# Patient Record
Sex: FEMALE | Race: WHITE | NOT HISPANIC OR LATINO | Employment: FULL TIME | URBAN - NONMETROPOLITAN AREA
[De-identification: names, ages, dates, MRNs, and addresses within clinical notes are randomized per-mention and may not be internally consistent; named-entity substitution may affect disease eponyms.]

---

## 2023-11-17 ENCOUNTER — OFFICE VISIT (OUTPATIENT)
Dept: PRIMARY CARE | Facility: CLINIC | Age: 53
End: 2023-11-17
Payer: COMMERCIAL

## 2023-11-17 VITALS
HEART RATE: 67 BPM | SYSTOLIC BLOOD PRESSURE: 110 MMHG | WEIGHT: 237 LBS | OXYGEN SATURATION: 97 % | DIASTOLIC BLOOD PRESSURE: 70 MMHG | BODY MASS INDEX: 34.01 KG/M2

## 2023-11-17 DIAGNOSIS — Z00.00 WELLNESS EXAMINATION: Primary | ICD-10-CM

## 2023-11-17 DIAGNOSIS — N39.3 STRESS INCONTINENCE: ICD-10-CM

## 2023-11-17 DIAGNOSIS — Z12.31 SCREENING MAMMOGRAM, ENCOUNTER FOR: ICD-10-CM

## 2023-11-17 DIAGNOSIS — E55.9 VITAMIN D DEFICIENCY: ICD-10-CM

## 2023-11-17 DIAGNOSIS — F41.0 PANIC ATTACKS: ICD-10-CM

## 2023-11-17 DIAGNOSIS — R53.83 OTHER FATIGUE: ICD-10-CM

## 2023-11-17 DIAGNOSIS — Z13.6 SCREENING FOR CARDIOVASCULAR CONDITION: ICD-10-CM

## 2023-11-17 PROBLEM — R93.3 ABNORMAL ENDOSCOPY OF UPPER GASTROINTESTINAL TRACT: Status: ACTIVE | Noted: 2023-11-17

## 2023-11-17 PROBLEM — N63.0 BREAST MASS: Status: RESOLVED | Noted: 2023-11-17 | Resolved: 2023-11-17

## 2023-11-17 PROBLEM — N90.5 VULVAR ATROPHY: Status: ACTIVE | Noted: 2023-11-17

## 2023-11-17 PROBLEM — R25.2 MUSCLE CRAMPS: Status: RESOLVED | Noted: 2023-11-17 | Resolved: 2023-11-17

## 2023-11-17 PROBLEM — L65.9 HAIR LOSS: Status: RESOLVED | Noted: 2023-11-17 | Resolved: 2023-11-17

## 2023-11-17 PROBLEM — E66.9 OBESITY (BMI 30-39.9): Status: ACTIVE | Noted: 2023-11-17

## 2023-11-17 PROBLEM — R22.2 CHEST MASS: Status: ACTIVE | Noted: 2023-11-17

## 2023-11-17 PROBLEM — G43.009 MIGRAINE WITHOUT AURA AND WITHOUT STATUS MIGRAINOSUS, NOT INTRACTABLE: Status: RESOLVED | Noted: 2023-11-17 | Resolved: 2023-11-17

## 2023-11-17 PROBLEM — K21.9 CHRONIC GERD: Status: RESOLVED | Noted: 2023-11-17 | Resolved: 2023-11-17

## 2023-11-17 PROBLEM — M23.206 OLD TEAR OF MENISCUS OF RIGHT KNEE: Status: ACTIVE | Noted: 2023-11-17

## 2023-11-17 LAB
ALBUMIN SERPL BCP-MCNC: 4.3 G/DL (ref 3.4–5)
ALP SERPL-CCNC: 63 U/L (ref 33–110)
ALT SERPL W P-5'-P-CCNC: 17 U/L (ref 7–45)
ANION GAP SERPL CALC-SCNC: 11 MMOL/L (ref 10–20)
AST SERPL W P-5'-P-CCNC: 19 U/L (ref 9–39)
BASOPHILS # BLD AUTO: 0.03 X10*3/UL (ref 0–0.1)
BASOPHILS NFR BLD AUTO: 0.5 %
BILIRUB SERPL-MCNC: 0.6 MG/DL (ref 0–1.2)
BUN SERPL-MCNC: 15 MG/DL (ref 6–23)
CALCIUM SERPL-MCNC: 9.3 MG/DL (ref 8.6–10.3)
CHLORIDE SERPL-SCNC: 105 MMOL/L (ref 98–107)
CHOLEST SERPL-MCNC: 141 MG/DL (ref 0–199)
CHOLESTEROL/HDL RATIO: 2.2
CO2 SERPL-SCNC: 28 MMOL/L (ref 21–32)
CREAT SERPL-MCNC: 0.85 MG/DL (ref 0.5–1.05)
EOSINOPHIL # BLD AUTO: 0.12 X10*3/UL (ref 0–0.7)
EOSINOPHIL NFR BLD AUTO: 2 %
ERYTHROCYTE [DISTWIDTH] IN BLOOD BY AUTOMATED COUNT: 13.1 % (ref 11.5–14.5)
GFR SERPL CREATININE-BSD FRML MDRD: 82 ML/MIN/1.73M*2
GLUCOSE SERPL-MCNC: 94 MG/DL (ref 74–99)
HCT VFR BLD AUTO: 44.1 % (ref 36–46)
HDLC SERPL-MCNC: 65.3 MG/DL
HGB BLD-MCNC: 14.1 G/DL (ref 12–16)
IMM GRANULOCYTES # BLD AUTO: 0.01 X10*3/UL (ref 0–0.7)
IMM GRANULOCYTES NFR BLD AUTO: 0.2 % (ref 0–0.9)
LDLC SERPL CALC-MCNC: 66 MG/DL
LYMPHOCYTES # BLD AUTO: 1.66 X10*3/UL (ref 1.2–4.8)
LYMPHOCYTES NFR BLD AUTO: 27.9 %
MCH RBC QN AUTO: 29.4 PG (ref 26–34)
MCHC RBC AUTO-ENTMCNC: 32 G/DL (ref 32–36)
MCV RBC AUTO: 92 FL (ref 80–100)
MONOCYTES # BLD AUTO: 0.6 X10*3/UL (ref 0.1–1)
MONOCYTES NFR BLD AUTO: 10.1 %
NEUTROPHILS # BLD AUTO: 3.53 X10*3/UL (ref 1.2–7.7)
NEUTROPHILS NFR BLD AUTO: 59.3 %
NON HDL CHOLESTEROL: 76 MG/DL (ref 0–149)
NRBC BLD-RTO: 0 /100 WBCS (ref 0–0)
PLATELET # BLD AUTO: 250 X10*3/UL (ref 150–450)
POTASSIUM SERPL-SCNC: 5 MMOL/L (ref 3.5–5.3)
PROT SERPL-MCNC: 7.2 G/DL (ref 6.4–8.2)
RBC # BLD AUTO: 4.8 X10*6/UL (ref 4–5.2)
SODIUM SERPL-SCNC: 139 MMOL/L (ref 136–145)
TRIGL SERPL-MCNC: 48 MG/DL (ref 0–149)
TSH SERPL-ACNC: 1.82 MIU/L (ref 0.44–3.98)
VLDL: 10 MG/DL (ref 0–40)
WBC # BLD AUTO: 6 X10*3/UL (ref 4.4–11.3)

## 2023-11-17 PROCEDURE — 85025 COMPLETE CBC W/AUTO DIFF WBC: CPT

## 2023-11-17 PROCEDURE — 99214 OFFICE O/P EST MOD 30 MIN: CPT | Performed by: FAMILY MEDICINE

## 2023-11-17 PROCEDURE — 80053 COMPREHEN METABOLIC PANEL: CPT

## 2023-11-17 PROCEDURE — 84443 ASSAY THYROID STIM HORMONE: CPT

## 2023-11-17 PROCEDURE — 36415 COLL VENOUS BLD VENIPUNCTURE: CPT

## 2023-11-17 PROCEDURE — 99396 PREV VISIT EST AGE 40-64: CPT | Performed by: FAMILY MEDICINE

## 2023-11-17 PROCEDURE — 1036F TOBACCO NON-USER: CPT | Performed by: FAMILY MEDICINE

## 2023-11-17 PROCEDURE — 82306 VITAMIN D 25 HYDROXY: CPT

## 2023-11-17 PROCEDURE — 80061 LIPID PANEL: CPT

## 2023-11-17 RX ORDER — CHOLECALCIFEROL (VITAMIN D3) 50 MCG
TABLET ORAL
COMMUNITY
Start: 2018-05-25

## 2023-11-17 RX ORDER — MULTIVITAMIN
TABLET ORAL
COMMUNITY

## 2023-11-17 RX ORDER — LEVOCETIRIZINE DIHYDROCHLORIDE 5 MG/1
TABLET, FILM COATED ORAL EVERY EVENING
COMMUNITY

## 2023-11-17 RX ORDER — HYDROXYZINE HYDROCHLORIDE 10 MG/1
10 TABLET, FILM COATED ORAL EVERY 6 HOURS PRN
Qty: 30 TABLET | Refills: 0 | Status: SHIPPED | OUTPATIENT
Start: 2023-11-17

## 2023-11-17 RX ORDER — HYDROXYZINE HYDROCHLORIDE 10 MG/1
TABLET, FILM COATED ORAL
COMMUNITY
Start: 2019-11-27 | End: 2023-11-17 | Stop reason: SDUPTHER

## 2023-11-17 NOTE — PATIENT INSTRUCTIONS
Schedule a yearly well woman exam with me .       For allergy symptoms  -   You can try the over the counter long acting antihistamines :   Claritin  ( loratadine)  or Allegra  (fexofenadine) or Zyrtec (cetirizine) or Xyzal  (levo-cetirizine).   You could also try the steroid nasal sprays:   Fluticasone (Flonase),  Nasacort, or Rhinocort.  Be sure to use them as directed.   If you  do use them,  after you insert them into your nose,   tilt outward toward your eye to avoid nose bleeds.   You may need to stay on these through your allergy season.   You might need only one of these,   but some people need both to control symptoms.    If you try these things for 2 weeks,  and they do not help,  stop them and make an appointment in the office.      Also - for  allergy eye symptoms   (redness, itch , watering)    -  you can try Olopatadine  - an over the counter eye drop that works very well.       Make an appt for your shingrix vaccine      Make an appt with DR Gonsales for the stress incontinence     Make an appt for your mammogram     WELL VISIT/PREVENTATIVE VISIT INSTRUCTIONS:        Call [phone] to schedule any testing ordered at [facility].      For a mammogram, call   [phone] .    Please work on healthy nutrition,  optimal sleep, and regular exercise to feel better.    If you smoke - please quit, and if you drink alcohol, please limit your intake.       Be sure to ask me about any vaccines you may be due for,  and ask me any questions you may have about vaccines.   Generally -  a flu shot is recommended every fall for everyone over 6 months of age,  a pneumonia shot is recommended for anyone 65 and over or who has chronic conditions such as diabetes, heart or lung disease,  the shingles vaccines are recommended for people age 50 and over,   a Tetnas/Pertussis booster is very 10 years (after the childhood set);  the RSV vaccine is for people age 65 and over,  and the COVID vaccines are recommended for  "everyone, but the boosters are often particular people, so ask me if you qualify.      There may be more vaccines you qualify for depending on your medical conditions, so be sure to ask me about that if you have any chronic illnesses.    Some people have insurance that will pay for the vaccines at the pharmacy, and some your doctors office - so be sure to check with your insurance about the best place to get your vaccines and your coverage of them.     Generally speaking,  good nutrition consists of lean meats, like chicken, fish and turkey (not fried);    plenty of fruits and vegetables (the fresher the better);   Less sugars, saturated fats, and processed foods - especially those made with white flour.   Try to eat the majority of your grain foods  as whole grains.   Keep an eye on your total calories in a day and serving sizes on packaging - this will help you limit your overall intake.    Remember, to lose weight (if you need to), your total calorie intake has to be about 300 - 500 calories less than what you burn in a day.   That number depends on your age, gender and body size.   Some people find a fitbit (calorie tracking device) helpful.      Please be sure to see the dentist every 6 months.    Please see the eye doctor no longer than every 2 years.    When you have your Living Will and Medical Power of  papers completed   (they have to be witnessed by 2 non-relatives or notarized to be legally binding),     please keep your originals in a safe place in your home, but make sure all your physicians have copies and that you take copies with you when you go to the hospital.        GETTING TEST RESULTS:    YOU WILL ALWAYS FIND OUT ABOUT ALL YOUR TEST RESULTS FROM ME.  I do not use the \"no news is good news\" policy.   The only time you would not, is if I have told you to get the testing done, and make a follow up appointment to go over it.    Then I will be waiting to talk to you at the visit about your " "test results.     I have a few different ways I get test results to you  (if its something urgent, we call you)  :       If you have a [employer] card -  I will have your test results on your secureach card within a week.  You should get a phone call telling you when the results are on the card, or just call the card in a week.   If two weeks go  by and you have not heard anything, call the card to see if the results are there,  and if not,  leave me a message.  If you are having trouble using [employer], they have a support line you should call :   [phone];   If you have lost your card, I need to know.     IT'S VERY IMPORTANT THAT YOU CALL TO LISTEN TO YOUR RESULTS, AS IT THEN LETS ME KNOW YOU GOT YOUR RESULTS.        If you get your test results on MY CHART,  you may commonly see your results even before I do.      I will always annotate a message on your results so you know that I saw them and how I feel about them.     If you need some help with MY CHART call the support number at [phone].    IF I mail your results to you,   I need to hear from you if you do not receive them within 2 weeks.      Be sure to let me know your preference for getting results.         BILLING FOR PREVENTATIVE VISITS.     Most insurance companies pay for a yearly \"Wellness Check\"  or they may call it a \"Preventative Physical\"   - but it's important to know what your plan covers ahead of the visit.    It's also a good idea to find out what sort of preventative testing they will cover for screening tests - like cholesterol, blood sugar, or colonoscopies. Also, find out which vaccines are covered and if you have any responsibility in paying for them.       If at your Wellness Visit,  we cover anything to do with problems/issues  you are having or  chronic medications/ medical conditions you have,   there will ALSO be a regular office charge that day.     Blood work  or testing obtained that has anything to do with an acute " "issue or chronic condition is billed under that diagnosis and not screening.       It's a good idea to find out from your insurance what is covered and what is your responsibility for all of the above possible charges.           Most importantly,   if you ever have any questions or concerns that cannot wait until your next visit with me,  you can message me through MY CHART or call the office and leave a voice mail message  (please allow for at least 24 hours for responses for these messages).       Take good care.   Dr Wasserman              For General Healthy Nutrition    (Remember - NOT A DIET!   Diets are only good for class reunions.)    These are my general good nutrition recommendations for most people.   I use the term \" diet \"  in these instructions to mean your overall nutrition - how you eat and drink.   If we talked about something different during your visit with me,  other than what is written below,  follow that advice instead.       For most people,  eating healthier means getting less added sugar and less processed foods in your diet    The fresher the better.    Added sugar is now a part of the nutrition label on manufactured food, so you can keep an eye on it easier.    But basically,  foods and beverages  that contain regular sugar and corn syrup are the main sources of added sugars.  Eating as little of these foods as you can is best.   One shocking example of the epidemic of added sugar is soda.    One can of regular soda contains about as much added sugar as 3 regular size doughnuts!     The other issue with processed foods is the amount of processed grains they contain , such as white flour.    This is also something you want to try to limit in your diet.     But, grain products are very important for your nutrition.    Whole grains are better for your body.     Cutting back on white breads, traditional pasta, baked goods, white rice,  and processed cereals will be healthier for you.   The better " choices include whole grain breads,  whole wheat pasta,  brown rice, quinoa, barley, steel cut  or rolled oats.   If you eat cereal for breakfast, try to look for one made with whole grains and less sugar.   There are many people who have a problem with gluten, for a large variety of reasons.    Generally,  products made with wheat flour , barley or rye are the primary source of gluten.       Cutting back on saturated fats is important.    You want the majority of the meat that you eat to be chicken, fish or turkey.   Baked or broiled is best -  fried adds too much fat.    There are healthy fats that are important - fat is important for holding down appetite, vitamin absorption and several metabolic processes in the body.  Monounsaturated fats raise HDL (good cholesterol) and lower LDL (bad cholesterol).   Olive oil, peanut oil, nuts, seeds, and avocados are great sources of the good fats.       Ideas are:   Trade sour cream dip for hummus (which is rich in olive oil) or guacamole; use veggies or whole-wheat chips to dip.    Nuts are an excellent source of protein and healthy fats.   Tree nuts are the best kind, such as almonds or walnuts.   Just be careful - they are high in calories, so stick to a serving size.  (Most are about 200 calories for a 1/4 cup)      Proteins are very important for your body, and they also hold down your appetite.   Try to have protein with every meal.    These generally are meats, nuts, many beans, legumes, eggs, and dairy.   You will find protein in whole grain products and some green vegetables have a little too.     When you have dairy (if you can - many people are lactose intolerant) try to make it low fat.    Ideas are 1% milk, lowfat yogurt or cheeses, low fat cottage cheese.   I don't generally recommend FAT FREE because they often contain artificial products to improve taste, and the fat helps hold down your appetite.   If you are lactose intolerant, try to see if taking Lactaid  "before having dairy helps.      Fresh fruits and vegetables are VERY important.  The brighter the better.   Many vegetables are considered \"Free Foods\" - meaning you can eat as much as you want, and it does not matter.  These include tomatoes, cucumbers, celery, peppers, all the various lettuces and kale - to name a few.   Potatoes, corn and peas are starchy, so do have more calories, but are still healthy - you just want to watch the amount of them you eat.       Fruits are full of wonderful nutrition.   They contain natural sugar called fructose, so eating them in moderation is best.   Diabetics may need to pay careful attention to how their body reacts to the sugar.  Some fruits might drastically increase their blood sugar.      Eating smaller meals with a couple of small snacks is better for your metabolism than not eating for long amounts of time  (breakfast is very important).   Trying to avoid large meals is helpful too.    Eating like this helps keep your appetite down and keeps you in burning metabolism rather than storage metabolism so your body will use the calories you eat.       I do not tell people to stop eating sweets or snack foods - just limit the amounts you have.  The less the better.   Pay attention to serving sizes, and treat them as a treat.        Foods like doughnuts, pop tarts, sugar cereals, cookies  ARE NOT GOOD FOR BREAKFAST.   They are loaded with sugar and will cause you to be hungrier in the day and often not feel well.    Caffeine needs to be limited - no more than 2 servings a day.  Some people can't have any at all.    (if you have any sleep or anxiety issues - stop the caffeine)   Coffee, many teas, many sodas, energy drinks, almost any diet supplement,  and chocolate all contain caffeine.      Water is important.   For most people, 8   x  8 ounces  a day are needed.  This may vary for some health issues.    If you need to be on a low sodium diet, that means looking at labels and " eating only 1000 - 2000 mg of sodium a day.    Calcium intake is important.  3 servings of a high calcium food or drink a day is recommended.   This is usually a cup of milk, a cup of yogurt, an ounce of a hard cheese or 1.5 ounces of a soft cheese are the usual servings.   There are other high calcium foods - including soy or almond milk, broccoli,  almonds, dark green leafy vegetable.   Make sure you are not getting more than 1000  - 1200 mg of total calcium a day (unless you have been told you need more by a doctor).    Vitamin D 3 is important to absorb the calcium and for your immune system.   For children, 400 IU a day is recommended.   For adults - 800 - 5000 IU a day  is recommended.  (Often the amount needed is individualized for adults - be sure to ask how much is right for you)    Physical activity is very important for good health.    Finding activities that give you regular exercise is very important for good health.  Try to find exercise you enjoy doing on a regular basis.    30 minutes at least 5 days a week of a good cardiovascular exercise is recommended.   That means something that gets your heart rate going faster than your usual baseline and you can find yourself breathing harder than usual while you are exercising.  If you have not done any exercise in a long time,  make sure you ask if its safe for you to start,  and be sure to gradually work up to your goal.      If you need to lose weight,  following these recommendations will help you.   And if you are doing all of this and still not losing weight, then its likely just the amount of food you are eating.   Learn to cut back on portion sizes.  Using smaller plates may help.  Healthy weight loss is  only about a pound a week.   You have to remember that whatever you do to lose the weight, you must be prepared to keep it up for life for the weight to stay off.     A lot of people have a lot of luck with using something like a fit bit,  or a  program where you keep track of all of your calories that you eat and what you burn off in the day.

## 2023-11-17 NOTE — PROGRESS NOTES
Subjective   Patient ID: Justine Bazzi is a 53 y.o. female who presents for Annual Exam (Wants some bloodwork.).    HPI   LOV - 2022-      Updates and concerns:   PACU manager - discussed all involved          Gets tired off and on  - more than before   Headaches more the past 3 weeks -   Sinuses are always congested -           Taking Xyzal  - that helps   Does get some lightheadedness off and on     needs to refill hydroxyzine - takes prn -   tolerates it well - needs 1/2 - 1 only -works well when she needs it - rare         Last WWE -    s/p total hyst and no cervix (Fam Hx of cancer and MAYANK 3 on pap) -    Used the one tube of premarin - and it helped - not needed since.     Does have stress incon  - would like referral to [facility]       - 4  Para - 4     Last mammogram - 2022-  WNL   Does have breast implants -    MOTHER BRCA - AGE 50  Concerns - none     Bone density - TOO YOUNG  Last DEXA  fracture history     Last colonoscopy -   19 - WNL - good for 10 years          EGD too - there was external compression - CT 2019 -   QUESTION OF SOFT TISSUE DENSITY NEAR ESOPHAGUS   NO COLON CANCER IN FAMILY  Repeat CT  - stable         No GERD issues      Last cholesterol -   SCREEN AT HOSP - 164 TOTAL - outstanding      Last blood sugar -   SCREEN AT HOSP 10/2019 - WNL      Hep C screen? - LOW RISK  HIV screen? - LOW RISK     vaccines -  Flu vaccine:   UTD   adacel in 2017   Shingrix  - got only one 22  COVID VACC X 3 DONE      vision - LAST TO EYE DOC 5 YEARS AGO - HAD LASIK about       dentist - GOES EVERY 6 MOS     BMI/weight -32   nutrition - EATING WELL   exercise - moving a lot      depression - doing well      Still head of surgery at [employer]      sleep - WELL WHEN SHE CAN SLEEP - BRAIN DOES NOT SHUT OFF      Functional Status: FULLY FUNCTIONAL      smoking status - NEVER SMOKED - NO TOBACCO  Need Screening Lung CT?     alcohol consumption - SOCIAL      Need coronary  calcium score? - DAD HAD MI AT 50 - Cor CT 2017 - ZERO      LIVING WILL/MEDICAL POA - NOT YET               Review of Systems    Objective   /70 (BP Location: Right arm, Patient Position: Sitting, BP Cuff Size: Large adult)   Pulse 67   Wt 108 kg (237 lb)   SpO2 97%   BMI 34.01 kg/m²     Physical Exam  Vitals reviewed.   Constitutional:       General: She is not in acute distress.     Appearance: She is obese. She is not ill-appearing, toxic-appearing or diaphoretic.   HENT:      Head: Normocephalic and atraumatic.      Right Ear: Tympanic membrane, ear canal and external ear normal.      Left Ear: Tympanic membrane, ear canal and external ear normal.      Nose: Nose normal.      Mouth/Throat:      Mouth: Mucous membranes are moist.      Pharynx: No posterior oropharyngeal erythema.   Eyes:      General: No scleral icterus.     Extraocular Movements: Extraocular movements intact.      Pupils: Pupils are equal, round, and reactive to light.   Cardiovascular:      Rate and Rhythm: Normal rate and regular rhythm.      Pulses: Normal pulses.      Heart sounds: No murmur heard.  Pulmonary:      Effort: Pulmonary effort is normal. No respiratory distress.      Breath sounds: Normal breath sounds. No wheezing.   Abdominal:      General: Bowel sounds are normal. There is no distension.      Palpations: Abdomen is soft.      Tenderness: There is no abdominal tenderness.   Musculoskeletal:         General: Normal range of motion.      Cervical back: Neck supple. No rigidity.      Right lower leg: No edema.      Left lower leg: No edema.   Lymphadenopathy:      Cervical: No cervical adenopathy.   Skin:     General: Skin is warm and dry.      Findings: No rash.   Neurological:      General: No focal deficit present.      Mental Status: She is alert and oriented to person, place, and time.   Psychiatric:         Mood and Affect: Mood normal.         Thought Content: Thought content normal.         Assessment/Plan    Problem List Items Addressed This Visit             ICD-10-CM       Medium    Panic attacks F41.0    Relevant Medications    hydrOXYzine HCL (Atarax) 10 mg tablet    Stress incontinence N39.3    Relevant Orders    Referral to Urogynecology    Vitamin D deficiency E55.9    Relevant Orders    Vitamin D 25-Hydroxy,Total (for eval of Vitamin D levels)     Other Visit Diagnoses         Codes    Wellness examination    -  Primary Z00.00    Screening mammogram, encounter for     Z12.31    Relevant Orders    BI mammo bilateral screening tomosynthesis    Other fatigue     R53.83    Relevant Orders    Comprehensive Metabolic Panel    CBC and Auto Differential    TSH with reflex to Free T4 if abnormal    Screening for cardiovascular condition     Z13.6    Relevant Orders    Lipid Panel          Gen wellness today  -     Discussed allergy meds     Checking labs -     Refilled hydroxyzine for the episodes of anxiety    Refer to urogyn for stress incon     We discussed at visit any disease processes that were of concern as well as the risks, benefits and instructions of any new medication provided.    See orders and discussion section for information handed to patient on their Clinical Summary.   Patient (and/or caretaker of patient if present)  stated all questions were answered, and they voiced understanding of instructions.

## 2023-11-18 LAB — 25(OH)D3 SERPL-MCNC: 35 NG/ML (ref 30–100)

## 2023-11-20 ENCOUNTER — PHARMACY VISIT (OUTPATIENT)
Dept: PHARMACY | Facility: CLINIC | Age: 53
End: 2023-11-20
Payer: COMMERCIAL

## 2023-11-20 PROCEDURE — RXMED WILLOW AMBULATORY MEDICATION CHARGE

## 2023-11-21 ENCOUNTER — PHARMACY VISIT (OUTPATIENT)
Dept: PHARMACY | Facility: CLINIC | Age: 53
End: 2023-11-21

## 2023-11-21 PROCEDURE — RXOTC WILLOW AMBULATORY OTC CHARGE

## 2023-12-04 ENCOUNTER — APPOINTMENT (OUTPATIENT)
Dept: PRIMARY CARE | Facility: CLINIC | Age: 53
End: 2023-12-04

## 2023-12-15 ENCOUNTER — APPOINTMENT (OUTPATIENT)
Dept: PRIMARY CARE | Facility: CLINIC | Age: 53
End: 2023-12-15
Payer: COMMERCIAL

## 2024-06-24 ENCOUNTER — PATIENT MESSAGE (OUTPATIENT)
Dept: PRIMARY CARE | Facility: CLINIC | Age: 54
End: 2024-06-24
Payer: COMMERCIAL

## 2024-06-24 ENCOUNTER — PHARMACY VISIT (OUTPATIENT)
Dept: PHARMACY | Facility: CLINIC | Age: 54
End: 2024-06-24
Payer: COMMERCIAL

## 2024-06-24 DIAGNOSIS — J01.90 ACUTE SINUSITIS, RECURRENCE NOT SPECIFIED, UNSPECIFIED LOCATION: Primary | ICD-10-CM

## 2024-06-24 PROCEDURE — RXMED WILLOW AMBULATORY MEDICATION CHARGE

## 2024-06-24 RX ORDER — AZITHROMYCIN 250 MG/1
TABLET, FILM COATED ORAL
Qty: 6 TABLET | Refills: 0 | Status: SHIPPED | OUTPATIENT
Start: 2024-06-24 | End: 2024-06-29

## 2024-07-15 ENCOUNTER — HOSPITAL ENCOUNTER (OUTPATIENT)
Dept: RADIOLOGY | Facility: HOSPITAL | Age: 54
Discharge: HOME | End: 2024-07-15
Payer: COMMERCIAL

## 2024-07-15 VITALS — BODY MASS INDEX: 31.78 KG/M2 | HEIGHT: 70 IN | WEIGHT: 222 LBS

## 2024-07-15 DIAGNOSIS — Z12.31 SCREENING MAMMOGRAM, ENCOUNTER FOR: ICD-10-CM

## 2024-07-15 PROCEDURE — 77063 BREAST TOMOSYNTHESIS BI: CPT

## 2024-07-15 PROCEDURE — 77067 SCR MAMMO BI INCL CAD: CPT | Performed by: RADIOLOGY

## 2024-07-15 PROCEDURE — 77063 BREAST TOMOSYNTHESIS BI: CPT | Performed by: RADIOLOGY

## 2024-11-18 ENCOUNTER — TELEMEDICINE (OUTPATIENT)
Dept: PRIMARY CARE | Facility: CLINIC | Age: 54
End: 2024-11-18
Payer: COMMERCIAL

## 2024-11-18 ENCOUNTER — PHARMACY VISIT (OUTPATIENT)
Dept: PHARMACY | Facility: CLINIC | Age: 54
End: 2024-11-18
Payer: COMMERCIAL

## 2024-11-18 DIAGNOSIS — J32.9 BACTERIAL SINUSITIS: Primary | ICD-10-CM

## 2024-11-18 DIAGNOSIS — B96.89 BACTERIAL SINUSITIS: Primary | ICD-10-CM

## 2024-11-18 PROCEDURE — RXMED WILLOW AMBULATORY MEDICATION CHARGE

## 2024-11-18 PROCEDURE — 99213 OFFICE O/P EST LOW 20 MIN: CPT | Performed by: FAMILY MEDICINE

## 2024-11-18 RX ORDER — DOXYCYCLINE 100 MG/1
100 CAPSULE ORAL 2 TIMES DAILY
Qty: 20 CAPSULE | Refills: 0 | Status: SHIPPED | OUTPATIENT
Start: 2024-11-18 | End: 2024-11-28

## 2024-11-18 ASSESSMENT — ENCOUNTER SYMPTOMS
SINUS PAIN: 1
SINUS PRESSURE: 1
SHORTNESS OF BREATH: 0
ACTIVITY CHANGE: 0
FEVER: 0
HEADACHES: 0
COUGH: 1
FATIGUE: 0
DIZZINESS: 0

## 2024-11-18 ASSESSMENT — LIFESTYLE VARIABLES: HISTORY_OF_SMOKING: I HAVE NEVER SMOKED

## 2024-11-18 NOTE — PROGRESS NOTES
Subjective   Patient ID: Justine Bazzi is a 54 y.o. female who presents for No chief complaint on file..    Virtual or Telephone Consent    An interactive audio and video telecommunication system which permits real time communications between the patient (at the originating site) and provider (at the distant site) was utilized to provide this telehealth service.   Verbal consent was requested and obtained from Justine Bazzi on this date, 11/18/24 for a telehealth visit.      Bacterial sinus   - reports she has been having sinus symptoms for approximately 2 weeks   - initially symptoms started with thinner more allergy type symptoms   - now having thicker dark green discharge   - now having more fatigue and cough   - does endorse body aches          Review of Systems   Constitutional:  Negative for activity change, fatigue and fever.   HENT:  Positive for sinus pressure and sinus pain.    Respiratory:  Positive for cough. Negative for shortness of breath.    Cardiovascular:  Negative for chest pain.   Neurological:  Negative for dizziness and headaches.       Objective   There were no vitals taken for this visit.    Physical Exam  Constitutional:       Appearance: Normal appearance.   Neurological:      Mental Status: She is alert.   Psychiatric:         Mood and Affect: Mood normal.         Behavior: Behavior normal.         Assessment/Plan   Problem List Items Addressed This Visit    None  Visit Diagnoses         Codes    Bacterial sinusitis    -  Primary J32.9, B96.89    stable   - tx with abx given duration of symptoms   - f/u PRN if not improving     Relevant Medications    doxycycline (Vibramycin) 100 mg capsule

## 2024-11-20 ENCOUNTER — PHARMACY VISIT (OUTPATIENT)
Dept: PHARMACY | Facility: CLINIC | Age: 54
End: 2024-11-20
Payer: COMMERCIAL

## 2024-11-20 PROCEDURE — RXMED WILLOW AMBULATORY MEDICATION CHARGE

## 2024-11-20 RX ORDER — AZITHROMYCIN 250 MG/1
TABLET, FILM COATED ORAL
Qty: 6 TABLET | Refills: 1 | OUTPATIENT
Start: 2024-11-20

## 2024-12-02 PROCEDURE — RXMED WILLOW AMBULATORY MEDICATION CHARGE

## 2024-12-06 ENCOUNTER — PHARMACY VISIT (OUTPATIENT)
Dept: PHARMACY | Facility: CLINIC | Age: 54
End: 2024-12-06
Payer: COMMERCIAL

## 2025-01-02 ENCOUNTER — APPOINTMENT (OUTPATIENT)
Dept: PRIMARY CARE | Facility: CLINIC | Age: 55
End: 2025-01-02
Payer: COMMERCIAL

## 2025-01-02 VITALS
HEART RATE: 58 BPM | WEIGHT: 236 LBS | DIASTOLIC BLOOD PRESSURE: 76 MMHG | OXYGEN SATURATION: 98 % | SYSTOLIC BLOOD PRESSURE: 112 MMHG | BODY MASS INDEX: 33.86 KG/M2

## 2025-01-02 DIAGNOSIS — L65.9 HAIR LOSS: ICD-10-CM

## 2025-01-02 DIAGNOSIS — M54.50 CHRONIC LEFT-SIDED LOW BACK PAIN WITHOUT SCIATICA: ICD-10-CM

## 2025-01-02 DIAGNOSIS — G89.29 CHRONIC LEFT-SIDED LOW BACK PAIN WITHOUT SCIATICA: ICD-10-CM

## 2025-01-02 DIAGNOSIS — F41.0 PANIC ATTACKS: ICD-10-CM

## 2025-01-02 DIAGNOSIS — Z00.00 WELLNESS EXAMINATION: Primary | ICD-10-CM

## 2025-01-02 DIAGNOSIS — Z13.6 SCREENING FOR CARDIOVASCULAR CONDITION: ICD-10-CM

## 2025-01-02 PROCEDURE — 99396 PREV VISIT EST AGE 40-64: CPT | Performed by: FAMILY MEDICINE

## 2025-01-02 PROCEDURE — 1036F TOBACCO NON-USER: CPT | Performed by: FAMILY MEDICINE

## 2025-01-02 RX ORDER — HYDROXYZINE HYDROCHLORIDE 10 MG/1
10 TABLET, FILM COATED ORAL EVERY 6 HOURS PRN
Qty: 30 TABLET | Refills: 2 | Status: SHIPPED | OUTPATIENT
Start: 2025-01-02

## 2025-01-02 NOTE — PATIENT INSTRUCTIONS
Next year you can make your apointment a well woman exam     Daily exercises we discussed for the hip -   If not helping - let me know - will need to go to physical therapy     Call [phone]  to set up coronary CT       You need to find a regular exercise routine       WELL VISIT/PREVENTATIVE VISIT INSTRUCTIONS:        Call [phone] to schedule any testing ordered at [facility].      For a mammogram, call   [phone]    Please work on healthy nutrition,  optimal sleep, and regular exercise to feel better.    If you smoke - please quit, and if you drink alcohol, please limit your intake.       Be sure to ask me about any vaccines you may be due for,  and ask me any questions you may have about vaccines.   Generally -  a flu shot is recommended every fall for everyone over 6 months of age,  a pneumonia shot is recommended for anyone 65 and over or who has chronic conditions such as diabetes, heart or lung disease,  the shingles vaccines are recommended for people age 50 and over,   a Tetnas/Pertussis booster is very 10 years (after the childhood set);  the RSV vaccine is for people age 65 and over,  and the COVID vaccines are recommended for everyone, but the boosters are often particular people, so ask me if you qualify.      There may be more vaccines you qualify for depending on your medical conditions, so be sure to ask me about that if you have any chronic illnesses.    Some people have insurance that will pay for the vaccines at the pharmacy, and some your doctors office - so be sure to check with your insurance about the best place to get your vaccines and your coverage of them.     Generally speaking,  good nutrition consists of lean meats, like chicken, fish and turkey (not fried);    plenty of fruits and vegetables (the fresher the better);   Less sugars, saturated fats, and processed foods - especially those made with white flour.   Try to eat the majority of your grain foods  as  "whole grains.   Keep an eye on your total calories in a day and serving sizes on packaging - this will help you limit your overall intake.    Remember, to lose weight (if you need to), your total calorie intake has to be about 300 - 500 calories less than what you burn in a day.   That number depends on your age, gender and body size.   Some people find a fitbit (calorie tracking device) helpful.      Please be sure to see the dentist every 6 months.    Please see the eye doctor no longer than every 2 years.    When you have your Living Will and Medical Power of  papers completed   (they have to be witnessed by 2 non-relatives or notarized to be legally binding),     please keep your originals in a safe place in your home, but make sure all your physicians have copies and that you take copies with you when you go to the hospital.        GETTING TEST RESULTS:    YOU WILL ALWAYS FIND OUT ABOUT ALL YOUR TEST RESULTS FROM ME.  I do not use the \"no news is good news\" policy.   The only time you would not, is if I have told you to get the testing done, and make a follow up appointment to go over it.    Then I will be waiting to talk to you at the visit about your test results.     I have a few different ways I get test results to you  (if its something urgent, we call you)  :       If you have a [employer] card -  I will have your test results on your secureach card within a week.  You should get a phone call telling you when the results are on the card, or just call the card in a week.   If two weeks go  by and you have not heard anything, call the card to see if the results are there,  and if not,  leave me a message.  If you are having trouble using [employer], they have a support line you should call :   [phone];   If you have lost your card, I need to know.     IT'S VERY IMPORTANT THAT YOU CALL TO LISTEN TO YOUR RESULTS, AS IT THEN LETS ME KNOW YOU GOT YOUR RESULTS.        If you get your test " "results on MY CHART,  you may commonly see your results even before I do.      I will always annotate a message on your results so you know that I saw them and how I feel about them.     If you need some help with MY CHART call the support number at [phone].    IF I mail your results to you,   I need to hear from you if you do not receive them within 2 weeks.      Be sure to let me know your preference for getting results.         BILLING FOR PREVENTATIVE VISITS.     Most insurance companies pay for a yearly \"Wellness Check\"  or they may call it a \"Preventative Physical\"   - but it's important to know what your plan covers ahead of the visit.    It's also a good idea to find out what sort of preventative testing they will cover for screening tests - like cholesterol, blood sugar, or colonoscopies. Also, find out which vaccines are covered and if you have any responsibility in paying for them.       If at your Wellness Visit,  we cover anything to do with problems/issues  you are having or  chronic medications/ medical conditions you have,   there will ALSO be a regular office charge that day.     Blood work  or testing obtained that has anything to do with an acute issue or chronic condition is billed under that diagnosis and not screening.       It's a good idea to find out from your insurance what is covered and what is your responsibility for all of the above possible charges.           Most importantly,   if you ever have any questions or concerns that cannot wait until your next visit with me,  you can message me through MY CHART or call the office and leave a voice mail message  (please allow for at least 24 hours for responses for these messages).       Take good care.   Dr Wasserman            For General Healthy Nutrition    (Remember - NOT A DIET!   Diets are only good for class reunions.)    These are my general good nutrition recommendations for most people.   I use the term \" diet \"  in these " instructions to mean your overall nutrition - how you eat and drink.   If we talked about something different during your visit with me,  other than what is written below,  follow that advice instead.       For most people,  eating healthier means getting less added sugar and less processed foods in your diet    The fresher the better.    Added sugar is now a part of the nutrition label on manufactured food, so you can keep an eye on it easier.    But basically,  foods and beverages  that contain regular sugar and corn syrup are the main sources of added sugars.  Eating as little of these foods as you can is best.   One shocking example of the epidemic of added sugar is soda.    One can of regular soda contains about as much added sugar as 3 regular size doughnuts!     The other issue with processed foods is the amount of processed grains they contain , such as white flour.    This is also something you want to try to limit in your diet.     But, grain products are very important for your nutrition.    Whole grains are better for your body.     Cutting back on white breads, traditional pasta, baked goods, white rice,  and processed cereals will be healthier for you.   The better choices include whole grain breads,  whole wheat pasta,  brown rice, quinoa, barley, steel cut  or rolled oats.   If you eat cereal for breakfast, try to look for one made with whole grains and less sugar.   There are many people who have a problem with gluten, for a large variety of reasons.    Generally,  products made with wheat flour , barley or rye are the primary source of gluten.       Cutting back on saturated fats is important.    You want the majority of the meat that you eat to be chicken, fish or turkey.   Baked or broiled is best -  fried adds too much fat.    There are healthy fats that are important - fat is important for holding down appetite, vitamin absorption and several metabolic processes in the body.  Monounsaturated  "fats raise HDL (good cholesterol) and lower LDL (bad cholesterol).   Olive oil, peanut oil, nuts, seeds, and avocados are great sources of the good fats.       Ideas are:   Trade sour cream dip for hummus (which is rich in olive oil) or guacamole; use veggies or whole-wheat chips to dip.    Nuts are an excellent source of protein and healthy fats.   Tree nuts are the best kind, such as almonds or walnuts.   Just be careful - they are high in calories, so stick to a serving size.  (Most are about 200 calories for a 1/4 cup)      Proteins are very important for your body, and they also hold down your appetite.   Try to have protein with every meal.    These generally are meats, nuts, many beans, legumes, eggs, and dairy.   You will find protein in whole grain products and some green vegetables have a little too.     When you have dairy (if you can - many people are lactose intolerant) try to make it low fat.    Ideas are 1% milk, lowfat yogurt or cheeses, low fat cottage cheese.   I don't generally recommend FAT FREE because they often contain artificial products to improve taste, and the fat helps hold down your appetite.   If you are lactose intolerant, try to see if taking Lactaid before having dairy helps.      Fresh fruits and vegetables are VERY important.  The brighter the better.   Many vegetables are considered \"Free Foods\" - meaning you can eat as much as you want, and it does not matter.  These include tomatoes, cucumbers, celery, peppers, all the various lettuces and kale - to name a few.   Potatoes, corn and peas are starchy, so do have more calories, but are still healthy - you just want to watch the amount of them you eat.       Fruits are full of wonderful nutrition.   They contain natural sugar called fructose, so eating them in moderation is best.   Diabetics may need to pay careful attention to how their body reacts to the sugar.  Some fruits might drastically increase their blood sugar.      Eating " smaller meals with a couple of small snacks is better for your metabolism than not eating for long amounts of time  (breakfast is very important).   Trying to avoid large meals is helpful too.    Eating like this helps keep your appetite down and keeps you in burning metabolism rather than storage metabolism so your body will use the calories you eat.       I do not tell people to stop eating sweets or snack foods - just limit the amounts you have.  The less the better.   Pay attention to serving sizes, and treat them as a treat.        Foods like doughnuts, pop tarts, sugar cereals, cookies  ARE NOT GOOD FOR BREAKFAST.   They are loaded with sugar and will cause you to be hungrier in the day and often not feel well.    Caffeine needs to be limited - no more than 2 servings a day.  Some people can't have any at all.    (if you have any sleep or anxiety issues - stop the caffeine)   Coffee, many teas, many sodas, energy drinks, almost any diet supplement,  and chocolate all contain caffeine.      Water is important.   For most people, 8   x  8 ounces  a day are needed.  This may vary for some health issues.    If you need to be on a low sodium diet, that means looking at labels and eating only 1000 - 2000 mg of sodium a day.    Calcium intake is important.  3 servings of a high calcium food or drink a day is recommended.   This is usually a cup of milk, a cup of yogurt, an ounce of a hard cheese or 1.5 ounces of a soft cheese are the usual servings.   There are other high calcium foods - including soy or almond milk, broccoli,  almonds, dark green leafy vegetable.   Make sure you are not getting more than 1000  - 1200 mg of total calcium a day (unless you have been told you need more by a doctor).    Vitamin D 3 is important to absorb the calcium and for your immune system.   For children, 400 IU a day is recommended.   For adults - 800 - 5000 IU a day  is recommended.  (Often the amount needed is individualized for  adults - be sure to ask how much is right for you)    Physical activity is very important for good health.    Finding activities that give you regular exercise is very important for good health.  Try to find exercise you enjoy doing on a regular basis.    30 minutes at least 5 days a week of a good cardiovascular exercise is recommended.   That means something that gets your heart rate going faster than your usual baseline and you can find yourself breathing harder than usual while you are exercising.  If you have not done any exercise in a long time,  make sure you ask if its safe for you to start,  and be sure to gradually work up to your goal.      If you need to lose weight,  following these recommendations will help you.   And if you are doing all of this and still not losing weight, then its likely just the amount of food you are eating.   Learn to cut back on portion sizes.  Using smaller plates may help.  Healthy weight loss is  only about a pound a week.   You have to remember that whatever you do to lose the weight, you must be prepared to keep it up for life for the weight to stay off.     A lot of people have a lot of luck with using something like a fit bit,  or a program where you keep track of all of your calories that you eat and what you burn off in the day.

## 2025-01-02 NOTE — ASSESSMENT & PLAN NOTE
Orders:    hydrOXYzine HCL (Atarax) 10 mg tablet; Take 1 tablet (10 mg) by mouth every 6 hours if needed for anxiety.

## 2025-01-02 NOTE — PROGRESS NOTES
Subjective   Patient ID: Justine Bazzi is a 54 y.o. female who presents for Annual Exam (Yearly check up and talk about some things.).    HPI   LOV - 2023     Updates and concerns:     Having an ache in her left hip -   Originates left low back   Does not hurt when she walks   Pain travels down her leg when she is lying on that side -   If turns it gets better     She was on the semeglutide  - but started to lose hair  - stopped it 3 months ago  - hair is still very thin   Eating is very good - but not getting enough exercise     Taking magnesium for leg cramps     Poor libido    Labs last year were very good         Chronic issues reviewed today :     Allergy and sinus issues -   Takes Xyzal prn       Anxiety  -   Takes hydroxyzine prn large crowds or flying   Needs refill   Works well                Last WWE -    s/p total hyst and no cervix (Fam Hx of cancer and MAYANK 3 on pap) -    Used the one tube of premarin - and it helped - not needed since.     Does have stress incon  - would like referral to [facility]  - to make appt       - 4  Para - 4     Last mammogram - 7/15/24 -  WNL   Does have breast implants -    MOTHER BRCA - AGE 51  Concerns - none     Bone density - TOO YOUNG  Last DEXA  fracture history     Last colonoscopy -   19 - WNL - good for 10 years          EGD too - there was external compression - CT 2019 -   QUESTION OF SOFT TISSUE DENSITY NEAR ESOPHAGUS   NO COLON CANCER IN FAMILY  Repeat CT  - stable         No GERD issues     CV risk -      Last cholesterol - 2023 - great      Last blood sugar -  2023 - great     Need coronary calcium score? - DAD HAD MI AT 51 - Cor CT 2017 - ZERO -   Would like to repeat      Hep C screen? - LOW RISK  HIV screen? 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     alcohol consumption - SOCIAL         LIVING WILL/MEDICAL POA - NOT YET          Review of Systems    Objective   /76 (BP Location: Right arm, Patient Position: Sitting, BP Cuff Size: Large adult)   Pulse 58   Wt 107 kg (236 lb)   SpO2 98%   BMI 33.86 kg/m²     Physical Exam  Vitals reviewed.   Constitutional:       General: She is not in acute distress.     Appearance: She is obese. She is not ill-appearing, toxic-appearing or diaphoretic.   HENT:      Head: Normocephalic and atraumatic.      Right Ear: Tympanic membrane, ear canal and external ear normal.      Left Ear: Tympanic membrane, ear canal and external ear normal.      Nose: Nose normal.      Mouth/Throat:      Mouth: Mucous membranes are moist.      Pharynx: No posterior oropharyngeal erythema.   Eyes:      General: No scleral icterus.     Extraocular Movements: Extraocular movements intact.      Pupils: Pupils are equal, round, and reactive to light.   Cardiovascular:      Rate and Rhythm: Normal rate and regular rhythm.      Pulses: Normal pulses.      Heart sounds: No murmur heard.  Pulmonary:      Effort: Pulmonary effort is normal. No respiratory distress.      Breath sounds: Normal breath sounds. No wheezing.   Abdominal:      General: Bowel sounds are normal. There is no distension.      Palpations: Abdomen is soft.      Tenderness: There is no abdominal tenderness.   Musculoskeletal:         General: Normal range of motion.      Cervical back: Neck supple. No rigidity.      Right lower leg: No edema.      Left lower leg: No edema.      Comments: 4 + out of 5 on left hip flexors -   5/5 right     Lymphadenopathy:      Cervical: No cervical adenopathy.   Skin:     General: Skin is warm and dry.      Findings: No rash.   Neurological:      General: No focal deficit present.      Mental Status: She is alert and oriented to person, place, and time.   Psychiatric:         Mood and Affect: Mood normal.         Thought Content: Thought content normal.         Assessment & Plan  Wellness examination       urged scheduling WWE next year   Panic attacks    Orders:    hydrOXYzine HCL (Atarax) 10 mg tablet; Take 1 tablet (10 mg) by mouth every 6 hours if needed for anxiety.    Screening for cardiovascular condition    Orders:    CT cardiac scoring wo IV contrast; Future    Chronic left-sided low back pain without sciatica       weakness of hip flexors -   Discussed the exercises that could help   Hair loss    Likely from semaglutide - should start to improve now that she is off it           Gen wellness today  -     Refilled hydroxyzine for the episodes of anxiety    Thinking about procedure for stress incon     We discussed at visit any disease processes that were of concern as well as the risks, benefits and instructions of any new medication provided.    See orders and discussion section for information handed to patient on their Clinical Summary.   Patient (and/or caretaker of patient if present)  stated all questions were answered, and they voiced understanding of instructions.

## 2025-03-17 ENCOUNTER — PHARMACY VISIT (OUTPATIENT)
Dept: PHARMACY | Facility: CLINIC | Age: 55
End: 2025-03-17
Payer: COMMERCIAL

## 2025-03-17 PROCEDURE — RXMED WILLOW AMBULATORY MEDICATION CHARGE

## 2025-03-17 RX ORDER — AZITHROMYCIN 250 MG/1
TABLET, FILM COATED ORAL
Qty: 6 TABLET | Refills: 1 | Status: CANCELLED | OUTPATIENT
Start: 2025-03-17

## 2025-04-25 ENCOUNTER — HOSPITAL ENCOUNTER (OUTPATIENT)
Dept: RADIOLOGY | Facility: HOSPITAL | Age: 55
Discharge: HOME | End: 2025-04-25
Payer: COMMERCIAL

## 2025-04-25 DIAGNOSIS — Z13.6 SCREENING FOR CARDIOVASCULAR CONDITION: ICD-10-CM

## 2025-04-25 PROCEDURE — 75571 CT HRT W/O DYE W/CA TEST: CPT

## 2025-05-23 ENCOUNTER — TELEMEDICINE (OUTPATIENT)
Dept: PRIMARY CARE | Facility: CLINIC | Age: 55
End: 2025-05-23
Payer: COMMERCIAL

## 2025-05-23 DIAGNOSIS — J06.9 VIRAL URI: Primary | ICD-10-CM

## 2025-05-23 PROCEDURE — 99213 OFFICE O/P EST LOW 20 MIN: CPT | Performed by: FAMILY MEDICINE

## 2025-05-23 NOTE — PROGRESS NOTES
I performed this visit using real-time telehealth tools, including an audio/video OR telephone connection between the patient listed who was located in the [facility] and myself, Maria Elena Wagner (Board certified in the [facility]).At the start of the visit, I introduced myself as Dr. Scott and verified the patients name, , and current physical location.  If they were currently outside of the state of [place], the visit was ended and the patient was referred to alternative means for evaluation and treatment.  The patient was made aware of the limitations of the telehealth visit.  They will not be physically examined and all issues may not be appropriate for a telehealth visit.  If necessary, an in-    In preparing for this visit and writing this note, I reviewed previous electronic medical records (labs, imaging and medical charts) of the patient available in the physician portal. Significant findings which helped in decision making are recorded in this encounter charting.  All allergies were reviewed with the patient and all medications reconciled verbally with the patient at the beginning oft the visit.    Chief complaint:     ST  Started with right ear ache on Wednesday  Came back from --airplane  Pain has gone down neck to clavicle  No glands swollen yesterday  Today has LAD  Tonsil on right side has white spots on it  Pain with swallowing -- can swallow  No ear pain with manipulation  No fever chills  aches  Slight congestion but nothing draining yet  No GI sxs  No cough  No HA  Can feel congestion in back upper throat--  Can hear it in voice-- congestion  No sick exposure  In front of plane-- [place]-     Age points--        -1         3-14 (1) 15-44 (0) > 45 (-1)  Fever     No  Exudate in throat  Yes -  1   Lymph nodes in neck   Yes -   1  Absence of cough  Yes -   no cough but congestion-- 0    Has not had strep in years-- used to get it a lot 25 years ago--     All other ROS (-)    General  appearance:  Vitals available from patient? no  Alert, oriented, pleasant, in no apparent distress? yes  Answers questions appropriately? yes  Eyes clear? yes  Is patient in respiratory distress? no  Throat exam: not available  Is patient coughing during consult: no  Audible wheezing noted? : no  Pt sounds congested?:  no  Sniffing or rhinorrhea?:  no  Tender neck LAD by pt exam?: yes  Psychiatric: Affect normal? yes  Other relevant physical exam:      +exudate on left tonsil-- bilateral redness of post pharynx  No shift or concern for PTA  Assessment and Plan:    Viral illness:  ST and getting some congestion--  Centor score 1-2  Symptomatic treatment with tylenol and or motrin  Rest and drink fluids  Recommend zinc   Follow up if worsens       Discussed with patient during visit  differential diagnosis; viral versus bacterial infection;  (if relevant); use of medications prescribed and possible side effects; appropriate over-the-counter medications; possible complications ; when to follow-up for in person evaluation.  All patient's questions were answered. Patient has good decision making capacity.  They are alert to person, place, time and situation.  Patient has the ability to communicate choice, understand information, consequences, and reason rationally.  If sent for in person care at  or ED,    I explained why Urgent in person exam was necessary and that failure to have further evaluation, and treatment today may lead to, negative outcomes, permanent disability, or even death.   If not sent for in person care today,   follow-up with your PCP in 2-3 days, sooner if not  improving.    Follow-up immediately if symptoms worsen.   If experiencing symptoms including but not limited to lethargy / chest pain / weakness / dizziness / difficulty breathing please call 911 or go to the closest emergency department for immediate care.   Limitations to telemedicine include inability to do a complete and accurate physical  exam.    Any concerns regarding this were conveyed with the patient and in person follow-up recommended if the nature of their concern/illness does not progress as anticipated during this visit.

## 2025-05-23 NOTE — PATIENT INSTRUCTIONS
"Viral illness  Symptomatic treatment with tylenol and or motrin  Rest and drink fluids  Recommend zinc   Follow up if worsens     Please send me a Memonict message if you have any questions or concerns.  FOR NON URGENT questions only.  Allow up to 72 hours for response.    If you have prescription issues or other questions you can email   Sue Manuel,  Digital Health Coordinator, at   [email or web address]     To connect with a new PCP please visit [email or web address]   or call [phone]    Rest and drink plenty of fluids    Tylenol and or motrin as needed for pain and fever (unless you have been told not to take these because of your personal medical history)    Discussed options and precautions (complaint specific and may include)  Viral versus bacterial infection; use of medications; possible side effects; appropriate over-the-counter medications; possible complications and /or when to follow-up.    Limitations to telemedicine include inability to do a complete and accurate physical exam.  Any concerns regarding this were conveyed with the patient and in person follow-up recommended if patient nature of illness does not progress as anticipated during this visit.  Red flags requiring in person care were discussed.     if sent for in person care at  or ED,  it was explained why and failure to have \"higher level of care\" further evaluation, and treatment today may lead, but is not limited to negative outcomes, permanent disability, or even death.     If not sent for in person care today, follow-up with your PCP in 2-3 days, sooner if not  improving.    Follow-up immediately if symptoms worsen. If experiencing symptoms including but not limited to lethargy / chest pain / weakness / dizziness / difficulty breathing please call 911 or go to the emergency department for immediate care.     All patient's questions were answered. Patient has good decision making capacity.  " They are alert to person, place, time and situation.  Patient has the ability to communicate choice, understand information, consequences, and reason rationally.    FOR THOSE WITH SYMPTOMS OF ILLNESS/UPPER RESPIRATORY INFECTION/COVID    CDC updated Respiratory Infection guidelines:   When you have a respiratory virus, stay home and away from others (including people  you live with who are not sick) Symptoms can include fever, chills, fatigue, cough,  runny nose, and headache (and others).    You may return to work when the following 3 conditions are met:    1) No fever without the use of a fever reducer for 24 hours  2 symptoms are overall improving AND  3) symptoms are mild     When you go back to your normal activities, take added precaution over the next 5 days, such as taking additional steps for  air, hygiene, masks, physical distancing, and/or testing when you will be around other people indoors.    Keep in mind that you may still be able to spread the virus that made you sick, even if you are feeling better. You are likely to be less contagious at this time, depending on factors like how long you were sick or how sick you were.    If you develop a fever or you start to feel worse after you have gone back to normal activities, stay home and away from others again until, for at least 24 hours, both are true: your symptoms are improving overall, and you have not had a fever (and are not using fever-reducing medication). Then take added precaution for the next 5 days.    If you never had symptoms but tested positive for a respiratory virus?, you may be contagious. For the next 5 days: take added precaution, such as taking additional steps for  air, hygiene, masks, physical distancing, and/or testing when you will be around other people indoors. This is especially important to protect people with factors that increase their risk of severe illness from respiratory viruses.  Avoid immunocompromised,  elderly, pregnant women, infants etc     Over-the-counter cold and cough medications    Take Mucinex for cough, drink plenty of fluids with this medication and will help break up congestion making it easier to cough up    For cough can use honey (children ages 1 and up) in hot tea or hot water. I recommend putting this in an insulated cup and carrying it around throughout the day to sip on.  Have it at your bedside at night in case you wake up coughing.  You can also use honey cough drops (adults and older children).    Recommend nasal saline for use in children and adults.  [place] can also be helpful.  (Never used tap water and a Neti pot.  Use distilled water.)    If you have plugged up congested ears or the feeling of fluid in your ears, you can use an over-the-counter nasal steroid spray like fluticasone (brand name Flonase) use 2 sprays into each nostril once or twice a day for 7 days.  This will help open up the eustachian tubes and allow the fluid to drain out of your ears.    Sleeping with your head/chest elevated can help with sinus drainage.    Adults only-can use nasal decongestant (like Afrin) at bedtime to open nasal passages so you can breathe through your nose while you sleep; avoid using for longer than 3 days as this medication can become addicting.  Do not use if you have high blood pressure or high heart rate.    For severe pain or fever in adult-Tylenol (2 extra strength) or ibuprofen (3-4 tabs equals 600 to 800 mg) alternating as needed for pain.  Tylenol doses should be 6 to 8 hours apart and ibuprofen doses should be 6 to 8 hours apart.      Common cold medications for adults explained:    **Cold medications for children are not recommended-only Tylenol, Motrin, honey (only for children older than 1 year), cool-mist humidifier, and nasal saline spray are recommended for children.    Mucinex-(generic name guaifenesin)-is an expectorant.  This will thin out all the thick mucus.  Must drink  plenty of liquids for this medicine to work.    Dextromethorphan (brand name Delsym or DM)-this medicine is a cough suppressant--caution/avoid use if taking SSRI medication because of risk of Serotonin Syndrome    Honey in hot water or tea-this is a natural cough suppressant    Decongestant nasal spray- (eg: Afrin) use for temporary relief of nasal congestion-best when used at bedtime to open up nasal passages so that you are not forced to mouth breathe overnight.    Sudafed (generic name pseudoephedrine-this must be bought from the pharmacist) DO NOT use this medicine if you have high blood pressure as it can raise your blood pressure higher.  Do not use if you have any irregular heart rate.  This medicine can help clear congestion in your sinuses.